# Patient Record
Sex: MALE | Race: WHITE | NOT HISPANIC OR LATINO | Employment: OTHER | URBAN - METROPOLITAN AREA
[De-identification: names, ages, dates, MRNs, and addresses within clinical notes are randomized per-mention and may not be internally consistent; named-entity substitution may affect disease eponyms.]

---

## 2020-01-06 ENCOUNTER — HISTORICAL (OUTPATIENT)
Dept: LAB | Facility: HOSPITAL | Age: 60
End: 2020-01-06

## 2020-01-06 LAB
ABS NEUT (OLG): 4.46 X10(3)/MCL (ref 2.1–9.2)
ALBUMIN SERPL-MCNC: 4 GM/DL (ref 3.4–5)
ALBUMIN/GLOB SERPL: 1.2 RATIO (ref 1.1–2)
ALP SERPL-CCNC: 71 UNIT/L (ref 46–116)
ALT SERPL-CCNC: 31 UNIT/L (ref 12–78)
AST SERPL-CCNC: 16 UNIT/L (ref 15–37)
BASOPHILS # BLD AUTO: 0 X10(3)/MCL (ref 0–0.2)
BASOPHILS NFR BLD AUTO: 1 %
BILIRUB SERPL-MCNC: 0.6 MG/DL (ref 0.2–1)
BILIRUBIN DIRECT+TOT PNL SERPL-MCNC: 0.19 MG/DL (ref 0–0.2)
BILIRUBIN DIRECT+TOT PNL SERPL-MCNC: 0.41 MG/DL (ref 0–0.8)
BUN SERPL-MCNC: 20.8 MG/DL (ref 7–18)
CALCIUM SERPL-MCNC: 9.5 MG/DL (ref 8.5–10.1)
CHLORIDE SERPL-SCNC: 103 MMOL/L (ref 98–107)
CO2 SERPL-SCNC: 26.8 MMOL/L (ref 21–32)
CREAT SERPL-MCNC: 1.34 MG/DL (ref 0.6–1.3)
EOSINOPHIL # BLD AUTO: 0.2 X10(3)/MCL (ref 0–0.9)
EOSINOPHIL NFR BLD AUTO: 2 %
ERYTHROCYTE [DISTWIDTH] IN BLOOD BY AUTOMATED COUNT: 12.2 % (ref 11.5–17)
EST. AVERAGE GLUCOSE BLD GHB EST-MCNC: 111 MG/DL
GLOBULIN SER-MCNC: 3.4 GM/DL (ref 2.4–3.5)
GLUCOSE SERPL-MCNC: 84 MG/DL (ref 74–106)
HBA1C MFR BLD: 5.5 % (ref 4.5–6.2)
HCT VFR BLD AUTO: 41.5 % (ref 42–52)
HGB BLD-MCNC: 13.8 GM/DL (ref 14–18)
IMM GRANULOCYTES # BLD AUTO: 0.01 % (ref 0–0.02)
IMM GRANULOCYTES NFR BLD AUTO: 0.1 % (ref 0–0.43)
LYMPHOCYTES # BLD AUTO: 2.2 X10(3)/MCL (ref 0.6–4.6)
LYMPHOCYTES NFR BLD AUTO: 30 %
MAGNESIUM SERPL-MCNC: 2 MG/DL (ref 1.8–2.4)
MCH RBC QN AUTO: 30.7 PG (ref 27–31)
MCHC RBC AUTO-ENTMCNC: 33.3 GM/DL (ref 33–36)
MCV RBC AUTO: 92.4 FL (ref 80–94)
MONOCYTES # BLD AUTO: 0.4 X10(3)/MCL (ref 0.1–1.3)
MONOCYTES NFR BLD AUTO: 5 %
NEUTROPHILS # BLD AUTO: 4.46 X10(3)/MCL (ref 1.4–7.9)
NEUTROPHILS NFR BLD AUTO: 62 %
PLATELET # BLD AUTO: 257 X10(3)/MCL (ref 130–400)
PMV BLD AUTO: 8.9 FL (ref 9.4–12.4)
POTASSIUM SERPL-SCNC: 4.1 MMOL/L (ref 3.5–5.1)
PROT SERPL-MCNC: 7.4 GM/DL (ref 6.4–8.2)
RBC # BLD AUTO: 4.49 X10(6)/MCL (ref 4.7–6.1)
SODIUM SERPL-SCNC: 139 MMOL/L (ref 136–145)
T3RU NFR SERPL: 36 % (ref 31–39)
T4 SERPL-MCNC: 7.7 MCG/DL (ref 4.7–13.3)
TSH SERPL-ACNC: 1.09 MIU/ML (ref 0.36–3.74)
VIT B12 SERPL-MCNC: 270 PG/ML (ref 193–986)
WBC # SPEC AUTO: 7.2 X10(3)/MCL (ref 4.5–11.5)

## 2021-03-20 ENCOUNTER — HISTORICAL (OUTPATIENT)
Dept: LAB | Facility: HOSPITAL | Age: 61
End: 2021-03-20

## 2021-03-20 LAB
ALBUMIN SERPL-MCNC: 3.9 GM/DL (ref 3.4–4.8)
ALBUMIN/GLOB SERPL: 1.5 RATIO (ref 1.1–2)
ALP SERPL-CCNC: 100 UNIT/L (ref 40–150)
ALT SERPL-CCNC: 50 UNIT/L (ref 0–55)
AST SERPL-CCNC: 28 UNIT/L (ref 5–34)
BILIRUB SERPL-MCNC: 0.8 MG/DL
BILIRUBIN DIRECT+TOT PNL SERPL-MCNC: 0.3 MG/DL (ref 0–0.5)
BILIRUBIN DIRECT+TOT PNL SERPL-MCNC: 0.5 MG/DL (ref 0–0.8)
BUN SERPL-MCNC: 16.8 MG/DL (ref 8.4–25.7)
CALCIUM SERPL-MCNC: 8.5 MG/DL (ref 8.8–10)
CHLORIDE SERPL-SCNC: 106 MMOL/L (ref 98–107)
CHOLEST SERPL-MCNC: 189 MG/DL
CHOLEST/HDLC SERPL: 3 {RATIO} (ref 0–5)
CO2 SERPL-SCNC: 27 MMOL/L (ref 23–31)
CREAT SERPL-MCNC: 0.81 MG/DL (ref 0.73–1.18)
DEPRECATED CALCIDIOL+CALCIFEROL SERPL-MC: 22.3 NG/ML (ref 30–80)
ERYTHROCYTE [DISTWIDTH] IN BLOOD BY AUTOMATED COUNT: 12.9 % (ref 11.5–17)
FT4I SERPL CALC-MCNC: 2.41 (ref 2.6–3.6)
GLOBULIN SER-MCNC: 2.6 GM/DL (ref 2.4–3.5)
GLUCOSE SERPL-MCNC: 92 MG/DL (ref 82–115)
HCT VFR BLD AUTO: 44.3 % (ref 42–52)
HDLC SERPL-MCNC: 55 MG/DL (ref 35–60)
HGB BLD-MCNC: 14.2 GM/DL (ref 14–18)
LDLC SERPL CALC-MCNC: 119 MG/DL (ref 50–140)
MCH RBC QN AUTO: 30.1 PG (ref 27–31)
MCHC RBC AUTO-ENTMCNC: 32.1 GM/DL (ref 33–36)
MCV RBC AUTO: 93.9 FL (ref 80–94)
PLATELET # BLD AUTO: 224 X10(3)/MCL (ref 130–400)
PMV BLD AUTO: 8.6 FL (ref 9.4–12.4)
POTASSIUM SERPL-SCNC: 4.2 MMOL/L (ref 3.5–5.1)
PROT SERPL-MCNC: 6.5 GM/DL (ref 5.8–7.6)
PSA SERPL-MCNC: 0.33 NG/ML
RBC # BLD AUTO: 4.72 X10(6)/MCL (ref 4.7–6.1)
SODIUM SERPL-SCNC: 142 MMOL/L (ref 136–145)
T3RU NFR SERPL: 36.5 % (ref 31–39)
T4 SERPL-MCNC: 6.61 UG/DL (ref 4.87–11.72)
TRIGL SERPL-MCNC: 75 MG/DL (ref 34–140)
TSH SERPL-ACNC: 0.94 UIU/ML (ref 0.35–4.94)
VLDLC SERPL CALC-MCNC: 15 MG/DL
WBC # SPEC AUTO: 5.8 X10(3)/MCL (ref 4.5–11.5)

## 2023-11-03 ENCOUNTER — LAB VISIT (OUTPATIENT)
Dept: LAB | Facility: HOSPITAL | Age: 63
End: 2023-11-03
Attending: INTERNAL MEDICINE
Payer: MEDICARE

## 2023-11-03 DIAGNOSIS — Z79.899 ENCOUNTER FOR LONG-TERM (CURRENT) USE OF MEDICATIONS: ICD-10-CM

## 2023-11-03 DIAGNOSIS — E55.9 VITAMIN D DEFICIENCY: ICD-10-CM

## 2023-11-03 DIAGNOSIS — Z00.00 ROUTINE GENERAL MEDICAL EXAMINATION AT A HEALTH CARE FACILITY: ICD-10-CM

## 2023-11-03 DIAGNOSIS — Z12.5 SCREENING FOR PROSTATE CANCER: ICD-10-CM

## 2023-11-03 DIAGNOSIS — R53.83 FATIGUE, UNSPECIFIED TYPE: Primary | ICD-10-CM

## 2023-11-03 DIAGNOSIS — Z82.49 FAMILY HISTORY OF ISCHEMIC HEART DISEASE: ICD-10-CM

## 2023-11-03 LAB
ALBUMIN SERPL-MCNC: 3.9 G/DL (ref 3.4–4.8)
ALBUMIN/GLOB SERPL: 1.1 RATIO (ref 1.1–2)
ALP SERPL-CCNC: 105 UNIT/L (ref 40–150)
ALT SERPL-CCNC: 40 UNIT/L (ref 0–55)
AST SERPL-CCNC: 25 UNIT/L (ref 5–34)
BASOPHILS # BLD AUTO: 0.04 X10(3)/MCL
BASOPHILS NFR BLD AUTO: 0.7 %
BILIRUB SERPL-MCNC: 0.6 MG/DL
BUN SERPL-MCNC: 18.6 MG/DL (ref 8.4–25.7)
CALCIUM SERPL-MCNC: 9.4 MG/DL (ref 8.8–10)
CHLORIDE SERPL-SCNC: 107 MMOL/L (ref 98–107)
CHOLEST SERPL-MCNC: 189 MG/DL
CHOLEST/HDLC SERPL: 3 {RATIO} (ref 0–5)
CO2 SERPL-SCNC: 27 MMOL/L (ref 23–31)
CREAT SERPL-MCNC: 1.13 MG/DL (ref 0.73–1.18)
DEPRECATED CALCIDIOL+CALCIFEROL SERPL-MC: 64.1 NG/ML (ref 30–80)
EOSINOPHIL # BLD AUTO: 0.14 X10(3)/MCL (ref 0–0.9)
EOSINOPHIL NFR BLD AUTO: 2.4 %
ERYTHROCYTE [DISTWIDTH] IN BLOOD BY AUTOMATED COUNT: 13.1 % (ref 11.5–17)
ERYTHROCYTE [SEDIMENTATION RATE] IN BLOOD: 9 MM/HR (ref 0–15)
EST. AVERAGE GLUCOSE BLD GHB EST-MCNC: 105.4 MG/DL
FT4I SERPL CALC-MCNC: 2.65 (ref 2.6–3.6)
GFR SERPLBLD CREATININE-BSD FMLA CKD-EPI: >60 MLS/MIN/1.73/M2
GLOBULIN SER-MCNC: 3.4 GM/DL (ref 2.4–3.5)
GLUCOSE SERPL-MCNC: 90 MG/DL (ref 82–115)
HBA1C MFR BLD: 5.3 %
HCT VFR BLD AUTO: 44.8 % (ref 42–52)
HDLC SERPL-MCNC: 61 MG/DL (ref 35–60)
HGB BLD-MCNC: 14 G/DL (ref 14–18)
IMM GRANULOCYTES # BLD AUTO: 0.01 X10(3)/MCL (ref 0–0.04)
IMM GRANULOCYTES NFR BLD AUTO: 0.2 %
LDLC SERPL CALC-MCNC: 115 MG/DL (ref 50–140)
LYMPHOCYTES # BLD AUTO: 1.75 X10(3)/MCL (ref 0.6–4.6)
LYMPHOCYTES NFR BLD AUTO: 29.4 %
MAGNESIUM SERPL-MCNC: 2 MG/DL (ref 1.6–2.6)
MCH RBC QN AUTO: 29.5 PG (ref 27–31)
MCHC RBC AUTO-ENTMCNC: 31.3 G/DL (ref 33–36)
MCV RBC AUTO: 94.5 FL (ref 80–94)
MONOCYTES # BLD AUTO: 0.39 X10(3)/MCL (ref 0.1–1.3)
MONOCYTES NFR BLD AUTO: 6.6 %
NEUTROPHILS # BLD AUTO: 3.62 X10(3)/MCL (ref 2.1–9.2)
NEUTROPHILS NFR BLD AUTO: 60.7 %
PLATELET # BLD AUTO: 278 X10(3)/MCL (ref 130–400)
PMV BLD AUTO: 9 FL (ref 7.4–10.4)
POTASSIUM SERPL-SCNC: 4.4 MMOL/L (ref 3.5–5.1)
PROT SERPL-MCNC: 7.3 GM/DL (ref 5.8–7.6)
PSA SERPL-MCNC: 0.35 NG/ML
RBC # BLD AUTO: 4.74 X10(6)/MCL (ref 4.7–6.1)
SODIUM SERPL-SCNC: 140 MMOL/L (ref 136–145)
T3RU NFR SERPL: 34.32 % (ref 31–39)
T4 SERPL-MCNC: 7.72 UG/DL (ref 4.87–11.72)
TRIGL SERPL-MCNC: 66 MG/DL (ref 34–140)
TSH SERPL-ACNC: 1.17 UIU/ML (ref 0.35–4.94)
VLDLC SERPL CALC-MCNC: 13 MG/DL
WBC # SPEC AUTO: 5.95 X10(3)/MCL (ref 4.5–11.5)

## 2023-11-03 PROCEDURE — 84153 ASSAY OF PSA TOTAL: CPT

## 2023-11-03 PROCEDURE — 80053 COMPREHEN METABOLIC PANEL: CPT

## 2023-11-03 PROCEDURE — 83735 ASSAY OF MAGNESIUM: CPT

## 2023-11-03 PROCEDURE — 82306 VITAMIN D 25 HYDROXY: CPT

## 2023-11-03 PROCEDURE — 36415 COLL VENOUS BLD VENIPUNCTURE: CPT

## 2023-11-03 PROCEDURE — 85652 RBC SED RATE AUTOMATED: CPT

## 2023-11-03 PROCEDURE — 84436 ASSAY OF TOTAL THYROXINE: CPT

## 2023-11-03 PROCEDURE — 85025 COMPLETE CBC W/AUTO DIFF WBC: CPT

## 2023-11-03 PROCEDURE — 84443 ASSAY THYROID STIM HORMONE: CPT

## 2023-11-03 PROCEDURE — 80061 LIPID PANEL: CPT

## 2023-11-03 PROCEDURE — 83036 HEMOGLOBIN GLYCOSYLATED A1C: CPT

## 2024-07-22 ENCOUNTER — TELEPHONE (OUTPATIENT)
Dept: NEUROLOGY | Facility: CLINIC | Age: 64
End: 2024-07-22
Payer: MEDICARE

## 2024-07-22 NOTE — TELEPHONE ENCOUNTER
Patient called back patient wife asked what was the call about. I explained the call is about DBS Consultation. Patient explains that she wanted a second opinion of what her  has. He keeps falling out of bed. He previous doctor recommend him to see another movement disorder specialist. But I inform them that he is not taking new patients I scheduled her on September 16th virtual visit.

## 2024-07-24 ENCOUNTER — TELEPHONE (OUTPATIENT)
Dept: NEUROLOGY | Facility: CLINIC | Age: 64
End: 2024-07-24
Payer: MEDICARE

## 2024-07-24 NOTE — TELEPHONE ENCOUNTER
----- Message from Berenice Pérez MA sent at 7/17/2024  8:45 AM CDT -----  Contact: Dr Miramontes-FAX    ----- Message -----  From: Marielena Hogan  Sent: 7/17/2024   8:32 AM CDT  To: Carlos Molina Staff    7/17/24      .Type:  Sooner Appointment Request    Caller is requesting a sooner appointment.  Caller declined first available appointment listed below.  Caller will not accept being placed on the waitlist and is requesting a message be sent to doctor.  Name of Caller: FAX   When is the first available appointment?   Symptoms:parkinsons- referral in media mgr  Would the patient rather a call back or a response via [facility]?  callback  Best Call Back Number:.[phone] (home)         Additional Information:  spoke w pt spouse, Dr Gonzalez requested

## 2024-08-01 ENCOUNTER — TELEPHONE (OUTPATIENT)
Dept: NEUROLOGY | Facility: CLINIC | Age: 64
End: 2024-08-01
Payer: MEDICARE

## 2024-08-01 ENCOUNTER — PATIENT MESSAGE (OUTPATIENT)
Dept: NEUROLOGY | Facility: CLINIC | Age: 64
End: 2024-08-01
Payer: MEDICARE

## 2024-08-01 NOTE — TELEPHONE ENCOUNTER
Called patient today about offering an appointment with the  on August 9th at 8:30am for a DBS ON/OFF Eval. Patient's wife picks up and asked about an ON/OFF. I explain to her what an ON/OFF is. She said she will give me a call back because her daughter is the one to transport them to the doctor.    UPDATE    She called me back and said the cannot make it. Next week school starts daughter has to take the kids to school. Patient's wife express gratitude for me helping her. I told her that her  still has his original appointment and that ill be sending another ON/OFF instructions with the original date.

## 2024-09-16 ENCOUNTER — TELEPHONE (OUTPATIENT)
Dept: NEUROLOGY | Facility: CLINIC | Age: 64
End: 2024-09-16
Payer: MEDICARE

## 2024-09-16 NOTE — TELEPHONE ENCOUNTER
Called patient today about there virtual appointment for DBS Consultation. Patient's wife picks up the phone and informs me that she does not have her husbands portal username. Informed her that she can reset his user name by clicking forgot user name. Patient's wife did it and was going thought the process of filling out the information. I told them I would call them back.

## 2024-10-04 ENCOUNTER — OFFICE VISIT (OUTPATIENT)
Dept: NEUROLOGY | Facility: CLINIC | Age: 64
End: 2024-10-04
Payer: MEDICARE

## 2024-10-04 ENCOUNTER — TELEPHONE (OUTPATIENT)
Dept: NEUROSURGERY | Facility: CLINIC | Age: 64
End: 2024-10-04
Payer: MEDICARE

## 2024-10-04 ENCOUNTER — PATIENT MESSAGE (OUTPATIENT)
Dept: NEUROLOGY | Facility: CLINIC | Age: 64
End: 2024-10-04

## 2024-10-04 DIAGNOSIS — G20.A2 PARKINSON'S DISEASE WITHOUT DYSKINESIA, WITH FLUCTUATING MANIFESTATIONS: Primary | ICD-10-CM

## 2024-10-04 NOTE — PROGRESS NOTES
The patient location is: home  The chief complaint leading to consultation is: iPD  Visit type: audiovisual  Total time spent with patient: 40mins  Each patient to whom he or she provides medical services by telemedicine is:  (1) informed of the relationship between the physician and patient and the respective role of any other health care provider with respect to management of the patient; and (2) notified that he or she may decline to receive medical services by telemedicine and may withdraw from such care at any time.    Notes: below    MOVEMENT DISORDERS CLINIC NEW VIDEO CONSULT NOTE    PCP/Referring Provider: Self, Aaareferral  No address on file  Date of Service: 10/4/2024    Chief Complaint: iPD    HPI: ELIZA Birch is a R HANDED 63 y.o. male with a medical issues significant for depression, iPD otherwise healthy comingf or DBS eval. Saw Dr. Gan. He reports 6-7 years ago he started with tremor R>L which progressed. Mostly upper body symptoms, tremor most prominent. At times he drags R leg. Fallen 3 times in past. No assist device needed. Still biking.    He forgets to take medicine and resists    His goal for DBS it to take less medications    Knows someone with DBS    No dyskiensia   Takes carbidopa/levodopa 25/100mg 1 tab PO BID  Selegiline 5mg BID  Entacapone 200mg TID  Neupro patch 4mg QHS  venlafaxine      Current Living Situation: at home      Review of Systems:   Review of Systems   Constitutional:  Negative for fever.   HENT:  Negative for congestion.    Eyes:  Negative for double vision.   Respiratory:  Negative for cough and shortness of breath.    Cardiovascular:  Negative for chest pain and leg swelling.   Gastrointestinal:  Negative for nausea.   Genitourinary:  Negative for dysuria.   Musculoskeletal:  Positive for falls.   Skin:  Negative for rash.   Neurological:  Positive for tremors and speech change. Negative for headaches.   Psychiatric/Behavioral:  Positive for depression.  Negative for memory loss.          Current Medications:  No outpatient encounter medications on file as of 10/4/2024.     No facility-administered encounter medications on file as of 10/4/2024.       Past Medical History:  Patient Active Problem List   Diagnosis    Parkinson's disease without dyskinesia, with fluctuating manifestations       Past Surgical History:  No past surgical history on file.    Social:  Social History     Socioeconomic History    Marital status:      Social Drivers of Health     Financial Resource Strain: Medium Risk (9/16/2024)    Overall Financial Resource Strain (CARDIA)     Difficulty of Paying Living Expenses: Somewhat hard   Food Insecurity: Food Insecurity Present (9/16/2024)    Hunger Vital Sign     Worried About Running Out of Food in the Last Year: Often true     Ran Out of Food in the Last Year: Sometimes true   Physical Activity: Sufficiently Active (9/16/2024)    Exercise Vital Sign     Days of Exercise per Week: 7 days     Minutes of Exercise per Session: 60 min   Stress: Stress Concern Present (9/16/2024)    [employer] [place] [employer] - Occupational Stress Questionnaire     Feeling of Stress : Very much   Housing Stability: High Risk (9/16/2024)    Housing Stability Vital Sign     Unable to Pay for Housing in the Last Year: Yes       Family History:  No family history on file.      ON/OFF VIDEO Rating Scale    Time since levodopa: 24hrs  Time to ON : ?    MOTOR EXAMINATION (OFF)       Speech  2 - Monotone, slurred but understandable; moderately impaired.   Facial Expression  2 - Slight but definitely abnormal diminution of facial expression.    Tremor at Rest:      Face, lips, chin 2 - Mild in amplitude and persistent. Or moderate in amplitude, but only intermittently present.    Hands:      right 3 - Moderate in amplitude and present most of the time.   left 3 - Moderate in amplitude and present most of the time.   Feet:      right 2 - Mild in amplitude  and persistent. Or moderate in amplitude, but only intermittently present.    left 3 - Moderate in amplitude and present most of the time.   Action or Postural Tremor of Hands      right 1 - Slight; present with action.   left 3 - Moderate in amplitude wiht posture holding as well as action.   Rigidity                          Finger Taps      right 3 - Severely impaired. Frequent hesitation in initiating movements or arrests in ongoing movement.   left 3 - Severely impaired. Frequent hesitation in initiating movements or arrests in ongoing movement.   Hand Movements      right 3 - Severely impaired. Frequent hesitation in initiating movements or arrests in ongoing movement.   left 3 - Severely impaired. Frequent hesitation in initiating movements or arrests in ongoing movement.   Rapid Alternating Movements of Hands      right 2 - Moderately impaired. Definite and early fatiguing. May have occasional arrests in movement.   left 2 - Moderately impaired. Definite and early fatiguing. May have occasional arrests in movement.   Leg Agility      right 1 - Mild slowing and/or reduction in amplitude.   left 1 - Mild slowing and/or reduction in amplitude.   Arising from Chair  1 - Slow; or may need more than one attempt.   Posture  1 - Not quite erect, slightly stooped posture; could be normal for older person.   Gait  1 - Walks slowly, may shuffle with short steps, but no festination (hastening steps) or propulsion.       Body Bradykinesia and Hypokinesia (Combining slowness, hesitancy, decreased armswing, small amplitude, and poverty of movement in general)  2 - Mild degree of slowness and poverty of movement which is definitely abnormal.       _______________________________________________________________      MOTOR EXAMINATION (ON)       Speech  0 - Normal.   Facial Expression  2 - Slight but definitely abnormal diminution of facial expression.    Tremor at Rest:      Face, lips, chin 0 - Absent.    Hands:      right  1 - Slight and infrequently present.    left 1 - Slight and infrequently present.    Feet:      right 0 - Absent.    left 0 - Absent.    Action or Postural Tremor of Hands      right 0 - Absent.    left 1 - Slight; present with action.   Rigidity                          Finger Taps      right 1 - Mild slowing and/or reduction in amplitude.   left 2 - Moderately impaired. Definite and early fatiguing. May have occasional arrests in movement.   Hand Movements      right 1 - Mild slowing and/or reduction in amplitude.   left 1 - Mild slowing and/or reduction in amplitude.   Rapid Alternating Movements of Hands      right 0 - Normal.   left 0 - Normal.   Leg Agility      right 0 - Normal.   left 0 - Normal.   Arising from Chair  0 - Normal.   Posture  0 - Normal erect.   Gait  0 - Normal.       Body Bradykinesia and Hypokinesia (Combining slowness, hesitancy, decreased armswing, small amplitude, and poverty of movement in general)  0 - None.       Yessica Christopher MD, MS [facility]  Department of Neurology  Movement Disorders        Laboratory Data:  NA    Imaging:  NA    Assessment//Plan:   Problem List Items Addressed This Visit          Neuro    Parkinson's disease without dyskinesia, with fluctuating manifestations - Primary    Current Assessment & Plan     Typical iPD with prominent UE tremors, minimal gait involvement. Robust Ldopa OFF/ON response suggestive of good DBS candidacy  TO complete candidacy eval with meet with NSGY and Neuropsych  Review expectation file with family  His goals are tremor reduction and med reduction best acchieved with STN target  Memory test to help decide target         Relevant Orders    Ambulatory consult to Neuropsychology       Yessica Christopher MD, MS [facility]  Department of Neurology  Movement Disorders

## 2024-10-04 NOTE — ASSESSMENT & PLAN NOTE
Typical iPD with prominent UE tremors, minimal gait involvement. Robust Ldopa OFF/ON response suggestive of good DBS candidacy  TO complete candidacy eval with meet with NSGY and Neuropsych  Review expectation file with family  His goals are tremor reduction and med reduction best acchieved with STN target  Memory test to help decide target

## 2024-10-04 NOTE — TELEPHONE ENCOUNTER
"Pt's wife would like to be informed of any can cancellations for the appt to be at an ealier date for at a later time in the day on 10/17. She will keep the appt scheduled as is.     ----- Message from Ana sent at 10/4/2024  3:34 PM CDT -----  Regarding: pt adivce4  Contact: [phone]  .Name Of Caller: Self     Contact Preference?: [phone]     What is the nature of the call?: pt needing to speak to the nurse. Pls call      Additional Notes:  "Thank you for all that you do for our patients"  "

## 2024-10-08 ENCOUNTER — OFFICE VISIT (OUTPATIENT)
Dept: NEUROLOGY | Facility: CLINIC | Age: 64
End: 2024-10-08
Payer: MEDICARE

## 2024-10-08 ENCOUNTER — TELEPHONE (OUTPATIENT)
Dept: NEUROSURGERY | Facility: CLINIC | Age: 64
End: 2024-10-08
Payer: MEDICARE

## 2024-10-08 DIAGNOSIS — G31.84 MCI (MILD COGNITIVE IMPAIRMENT): Primary | ICD-10-CM

## 2024-10-08 DIAGNOSIS — G20.A2 PARKINSON'S DISEASE WITHOUT DYSKINESIA, WITH FLUCTUATING MANIFESTATIONS: ICD-10-CM

## 2024-10-08 DIAGNOSIS — F32.4 MAJOR DEPRESSIVE DISORDER WITH SINGLE EPISODE, IN PARTIAL REMISSION: ICD-10-CM

## 2024-10-08 PROCEDURE — 96138 PSYCL/NRPSYC TECH 1ST: CPT | Mod: S$PBB,,, | Performed by: STUDENT IN AN ORGANIZED HEALTH CARE EDUCATION/TRAINING PROGRAM

## 2024-10-08 PROCEDURE — 99999 PR PBB SHADOW E&M-EST. PATIENT-LVL I: CPT | Mod: PBBFAC,,, | Performed by: STUDENT IN AN ORGANIZED HEALTH CARE EDUCATION/TRAINING PROGRAM

## 2024-10-08 PROCEDURE — 99211 OFF/OP EST MAY X REQ PHY/QHP: CPT | Mod: PBBFAC

## 2024-10-08 PROCEDURE — 99211 OFF/OP EST MAY X REQ PHY/QHP: CPT | Mod: PBBFAC,27 | Performed by: STUDENT IN AN ORGANIZED HEALTH CARE EDUCATION/TRAINING PROGRAM

## 2024-10-08 PROCEDURE — 96139 PSYCL/NRPSYC TST TECH EA: CPT | Mod: S$PBB,,, | Performed by: STUDENT IN AN ORGANIZED HEALTH CARE EDUCATION/TRAINING PROGRAM

## 2024-10-08 PROCEDURE — 96116 NUBHVL XM PHYS/QHP 1ST HR: CPT | Mod: S$PBB,,, | Performed by: STUDENT IN AN ORGANIZED HEALTH CARE EDUCATION/TRAINING PROGRAM

## 2024-10-08 PROCEDURE — 99499 UNLISTED E&M SERVICE: CPT | Mod: S$PBB,,, | Performed by: STUDENT IN AN ORGANIZED HEALTH CARE EDUCATION/TRAINING PROGRAM

## 2024-10-08 PROCEDURE — 99999 PR PBB SHADOW E&M-EST. PATIENT-LVL I: CPT | Mod: PBBFAC,,,

## 2024-10-08 PROCEDURE — 96116 NUBHVL XM PHYS/QHP 1ST HR: CPT | Mod: PBBFAC | Performed by: STUDENT IN AN ORGANIZED HEALTH CARE EDUCATION/TRAINING PROGRAM

## 2024-10-08 PROCEDURE — 96133 NRPSYC TST EVAL PHYS/QHP EA: CPT | Mod: S$PBB,,, | Performed by: STUDENT IN AN ORGANIZED HEALTH CARE EDUCATION/TRAINING PROGRAM

## 2024-10-08 PROCEDURE — 96132 NRPSYC TST EVAL PHYS/QHP 1ST: CPT | Mod: S$PBB,,, | Performed by: STUDENT IN AN ORGANIZED HEALTH CARE EDUCATION/TRAINING PROGRAM

## 2024-10-08 NOTE — PROGRESS NOTES
NEUROPSYCHOLOGY CONSULT    Referral Information  NAME:  ELIZA Birch DATE OF SERVICE: 10/08/2024   MRN#:  [id number] EDUCATION: 12   AGE: 63 y.o. HANDEDNESS: Right    : 1960 RACE: White   SEX: Male REFERRAL: Yessica Christopher MD;  Neurology, [facility]     Evaluation methods: I had the pleasure of seeing ELIZA Birch on 10/08/2024 in person at the [facility], Department of Neurology. Data sources for the below report include review of the available medical record, an interview with the patient, a collateral interview with their daughter with their expressed consent, and administration of a series of neuropsychological tests listed in the Results section of this report. At the outset of the appointment, the undersigned explained the rationale for the evaluation along with the limits of confidentiality; and verbal informed consent for this evaluation was obtained.    This assessment was completed in conjunction with neurospychology practicum student, Steven Zapata MA, under my direct supervision. All aspects of this report have been reviewed and revised as appropriate.    The chief complaint/medical necessity leading to consultation/medical necessity is: cognitive decline    NEUROPSYCHOLOGICAL EVALUATION - CONFIDENTIAL    SUMMARY/TREATMENT PLAN   Summary of History:  Mr. Birch is a 63 y.o., right-handed, White, man with 12 years of formal education. He was referred by his neurologist for presurgical evaluation in consideration of DBS surgery for management of idiopathic Parkinson's disease (IPD). On/off evaluation completed on 10/04/2024 revealed a robust response to levodopa. Medical history is notable for the onset of parkinsonian symptoms approximately 6-7 years ago as R>L tremor which has progressed, per his referring movement disorders neurologist. Given his goal of achieving tremor reduction and reduced medications, his team is considering a STN target at this  time. Brain imaging is not available for review. Most-recent laboratory studies were drawn on 11/03/2023 and were non-contributory.    During interview, Mr. Birch reported the insidious onset of cognitive concerns beginning approximately in October 2023, approximately 5-6 years after the onset of movement symptoms. He characterized difficulties with his working memory and subtle changes in word-finding. Functionally, Mr. Birch denied changes in functional independence related to his above cognitive concerns.    Emotionally, Mr. Birch is being treated for depression and anxiety, via Venlafaxine, by his neurologist. He discussed good response to this therapy, and noted that when he ran out of Venlafaxine he had the return of depression and experienced brief, passive thoughts of suicide without plan or intent. He denied current suicidal ideation when assessed. Mr. Birch endorsed RBD, which has become less troubling since he commenced Rotigotine. With treatment, he denied difficulties falling and staying asleep.    As to his goals for DBS surgery, Mr. Birch discussed an interest in managing tremor and reducing his medication burden. He appropriately discussed pros and cons of surgery and is considering these as well as timing and risk in making determinations about his surgical interest.      Test Results:    Key Findings:     Mr. Birch is a man of estimated low average baseline cognitive functioning based on a combined metric including demographic data and his performance on a single word reading task.   Performances in the following areas were within normal limits, suggesting intact cognitive functioning in relevant domains:   Expressive language (confrontation naming and fluency)  Visuospatial skills   Verbal and visual abstraction  Auditory attention and working memory (variable; see behavioral observations)  Processing speed  Performances in the following areas were below normal  limits, suggesting weakness or declines in relevant domains:   Learning, retention, and recall for verbal information with variable benefit from recognition (frequent intrusion errors).   Learning, and recall for visual information with good retention and minimal benefit from recognition cues  Set-shifting/multitasking  Fluid reasoning and problem-solving with continuous feedback (markedly perseverative response style)  Behaviorally, Mr. Birch appeared to retain information adequately to track the assessment and interview and recalled details from conversations with the examiner after several hours.   Mr. Birch's daughter endorsed mild depression with minimal distress on a neuropsychiatric questionnaire.   Mr. Birch's daughter denied clinically significant declines in everyday functioning.   On screening measures of depression and anxiety, Mr. Birch did not endorse clinically significant symptoms of depression or anxiety.     Data Synthesis: Test performances suggest predominately frontal-subcortical systems dysfunction; mesial temporal systems dysfunction are less-likely based on the above, though cannot be wholly ruled out.      Diagnostic Considerations: Evidence for clinically-significant cognitive decline, absent clear evidence of functional impairment is consistent with mild cognitive impairment.    Etiologic Considerations: Parkinson's disease is thought the most likely etiology for his cognitive concerns. Hs past use of alcohol is a likely contributing factor. Workup below may help rule out a co-occurring degenerative neurocognitive process although this is thought less likely.    Determination of surgical risk: Given the above cognitive concerns, Mr. Birch is at elevated cognitive risk should he undergo STN DBS surgery, particularly as it relates to memory changes. Significant behavioral, psychological (on his current regimen), social, or decision-making risk factors were not  identified.       Diagnoses  1. MCI (mild cognitive impairment)        2. Parkinson's disease without dyskinesia, with fluctuating manifestations        3. Major depressive disorder with single episode, in partial remission             Provider Recommendations:   Mr. Birch will be encouraged to follow up with his referring provider in order to integrate these findings into the overall context of his clinical care, and to implement any of the below recommendations as appropriate.     Consider medications to manage cognitive decline in the context of a neurocognitive disease, relative to risk of worsening tremor.      Current pharmacologic management of depression appears appropriate.     Repeat assessment is recommended in 12 months, in order to assess for changes over time and update his treatment plan. Scores from this assessment should be used as a baseline for comparison at that time.     Patient Recommendations:  The next step in your care is to follow up with your referring provider in order to help with continued management of your care. The below recommendations may help you and your family compensate for your difficulties and better understand the reasons for your cognitive changes.     Due to difficulties with memory on cognitive testing in particular, there is increased risk that DBS surgery to the currently proposed target will affect your memory and day-to-day functional independence. Consider discussing options with your neurosurgical team.     I have asked your team to consider medications to help manage cognitive changes most likely due to Parkinson's disease. Because these medications can help thinking skills but in some cases can make tremor worse, I encourage you to consider the pros and cons before choosing to start these medications.    Engage in physical activity (i.e., something that gets your heart rate up) totaling at least 15-30 minutes per day. Regular exercise is very important for  both long-term health and stress management. Walking, hiking, elliptical, stationary biking, dancing, Charlie Chi and yoga are all good options. Consider working with a physical therapist to tailor an exercise program to reduce fall risk and manage motor symptoms in Parkinson's disease.     Remain mentally engaged by keeping socially active, reading, learning new skills, playing games, or participating in a hobby.    Get a good night's sleep by avoiding screens 30-60 minutes before bed and sticking to a regular sleep schedule.    Eat a balanced, heart-healthy diet that is low in salt, saturated fats, and added sugar. The Mediterranean diet has been shown to be especially healthy; studies show that it may reduce the risk of developing dementia and slow the rate of age-related cognitive decline.   Because Parkinson's disease can increase risk of constipation, avoid high-fat diets and large meals as this can worsen constipation. Eat plenty of fiber and stay well hydrated.     Work closely with your doctor(s) to manage any vascular conditions such as high blood pressure, high cholesterol, and diabetes. Follow the management guidelines from the American Heart Association at www.heart.org.    Repeat assessment is indicated in 12 months, or sooner in the event that you or your family notice significant cognitive or functional declines. At that time scores from this assessment should be used as a baseline for comparison.      Mr. Birch will be provided the results of the evaluation.     Thank you for allowing me to participate in [facility].  If you have any questions, please contact me at [phone].        _____________________  Myles Vickers, Ph.D.  Neuropsychologist  Department of Neuropsychology  [facility], [place]    HISTORY OF PRESENT ILLNESS: Mr. ELIZA Birch is an 63 y.o., right-handed,  male with 12 years of education who was referred for a neuropsychological evaluation in the  setting of presurgical evaluation for consideration of DBS implantation for treatment of Parkinson's.     Onset and Course of Cognitive Concerns: Insidious, beginning approximately one year ago, as difficulties with mental tracking, often forgetting why he has gone to a room or what tool he had gone to the shop to get. Mr. Birch reported that his cognitive skills have been slowly worsening over time.    Characterization of Cognitive Concerns  Attention/ working memory: Mr. Birch discussed mental tracking concerns have been his chief difficulty.  Processing speed: Mr. Birch denied slowing of processing speed.  Language: Mr. Birch discussed subtle difficulties recalling names of those he sees infrequently. His daughter thought this was typical of his baseline.  Visual-spatial/ navigation: Mr. Birch denied difficulties with visual-spatial skills or navigation.  Psychomotor: Mr. Birch has right worse than left hand tremor, and mild lower lip tremor.  Memory: Mr. Birch denied difficulties with memory atypical of others his age; accounting for the above attention/ working memory concerns.   Executive Functions: Mr. Birch denied difficulties with decision-making or reasoning skills. He and his daughter denied compulsions, disinhibition, or other concerning frontal-behavioral changes.   Orientation: Mr. Birch denied difficulties in this domain.     PD Review of Symptoms:  Tremor: Present, R > L; initially mitigated with alcohol by report, observed first during withdrawal.   Gait change: He does not notice, though his family has observed stooped posture, and mild shuffling gait per his daughter.   Rigidity: Denied, not observed.   Bradykinesia: He discussed it is harder for him to open his mouth as wide as he used to without deliberative effort, which has made it harder to eat. Otherwise denied slow/ small movements.   Masked facies: Denied.   Anosmia: He discussed longstanding  "poor sense of smell.   Dysarthria/Hypophonia: Denied  Dysphagia/Sialorrhea: Sialorrhea is present, when he is tired, but not often.   Depression: He discussed a history of depression and anxiety, managed with venlafaxine. He discussed that when he ran out of medications depression markedly worsened. Depression began shortly after the onset of Parkinson's disease and the need to start to receive care that he was not used to.   Cognitive slowing: Denied  Fluctuating cognition: Denied chante fluctuations, he discussed sleepiness in the afternoons coinciding with his medications.   Hallucinations: Denied  Impulsivity: Denied  Obsessions/Compulsions: Denied  Urinary changes: Denied issues with continence, though discussed frequency.   Constipation: Denied  Orthostasis: Denied  Falls: He denied ground-level falls, tripping, or other such problems. He has fallen while his ladder fell.   Freezing: Denied. He has noticed that after sitting in a vehicle for a long time, it is harder for him to initiate movement but he discussed that this may be age-typical.   Micrographia: Denied, and not observed.  Sleep issues:       -NASRIN: Denied.       -RBD: He has been "fighting karate" in his sleep. He also yelled out in his sleep in the past. This has become less frequent recently, attributed to his medications.   Vision:  Unchanged, corrected to adequate.     Medication side-effects: He has started to notice some greater on/ off effects around dose times. He perceived that his medications lead him to be sleepy. He discussed that medication costs have also become a significant barrier such that he and his family have had to change life and longterm plans to accommodate.     Neuropsychiatric Symptoms:  Hallucinations: Denied  Delusional/Paranoid Thinking: Denied  Apathy: Denied  Irritability/Agitation: Denied  Disinhibition: Denied      DAILY FUNCTIONING:  Living Environment: He lives with his wife in a single family home.     BASIC " ADLS:  Feeding: independent  Dressing: independent  Bathing: independent  Toileting: independent    IADLS:  Support System: His wife, his daughter, as needed.   Appointment Management: independent generally, his schedule has become more complex due to his neurologic changes.   Medication Compliance: He has some missed doses of medications, although he discussed that this is a combination of preferring not to take some medications and doses; and now having to take medications in spite of not having to manage this activity for much of his life.   Financial Management: His wife has always managed finances, he and his daughter denied changes.   Cooking: independent, and effective.   Driving: He continues to drive and rides his motorcycle, reportedly without error.       BRAIN HEALTH RISK FACTORS:  Hearing Loss: He discussed mild hearing loss.   Vision Loss: Denied  Physical Activity: Very active, he has noticed some fatigue. He rides his electric assist bicycle often.   Social Isolation: Denied  Falls: Denied  Sleep: He sleeps soundly now reportedly with Neupro 4 mg patch.     MEDICAL HISTORY: Mr. Birch  has no past medical history on file.    NEUROIMAGING:  He discussed he had completed a brain scan, possibly an MRI that is unavailable for review.     Current medications: Mr. Birch currently has no medications in their medication list.     Review of patient's allergies indicates:  Not on File    PSYCHIATRIC HISTORY: He is managed for depression and anxiety via venlafaxine, prescribed by his neurologist.     SUICIDAL IDEATION:  History: He discussed brief passive thoughts of suicide without plan or intent when he ran out of Effexor.   Current Stressors: PD  Active Suicidal Ideation:Denied  Plan/Intent: Denied    FAMILY HISTORY: family history is not on file.    PSYCHOSOCIAL HISTORY:   Education:   Level Attained: 12   Learning Difficulties: Denied, remarking that he did not like school due to socializing. He  denied behavioral problems.    Special Education: Denied   Repeated Grade: Denied     Vocation:   Highest Attained: He worked for [employer]. 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    Relationship Status/ Support Network:   : Yes, for 40 years.    : No   Children: 3, and 5 grandchildren.      SUBSTANCE USE:   Alcohol: He drank between 6-12 beers a day for many years. He has reduced his pattern of use, and ceases drinking if he is taking medications that require him to do so. He denied a history of negative consequences from this pattern of use.   Recreational Substances: Denied  Tobacco Products: Denied.     MENTAL STATUS AND OBSERVATIONS:  APPEARANCE: Casually dressed and adequate grooming/hygiene.   ALERTNESS/ORIENTATION: Attentive and alert. Mr. Birch was fully oriented to person, place, and situation; and roughly to time, stating the date as the 12th of October.   GAIT/MOTOR: Subtle right upper extremity and lower lip tremor were observed, most noticeable when attention was drawn elsewhere. He was able to inhibit tremor with attention. He arose independently from a chair, using both hands on the arm rest.   SENSORY: Vision and hearing were adequate for assessment.   SPEECH/LANGUAGE: Normal in rate, rhythm, tone, and volume. Expressive and receptive language were grossly intact.  MOOD/AFFECT: Mood was euthymic and affect was mood-congruent.   INTERPERSONAL BEHAVIOR: Rapport was quickly and easily established   THOUGHT PROCESSES: Thoughts seemed logical and goal-directed.   TESTING OBSERVATIONS: Mr. Birch did all that was asked of him without complaint or criticism. He made self-critical comments when he perceived himself to be performing poorly. On one math-related task, he appeared to give answers without considering the  question. During a task of problem solving, Mr. Birch verbalized strategies to complete the task despite not utilizing them.     RESULTS     Tests Administered (manual norms used unless otherwise indicated): [employer] - Test of Premorbid Functioning (TOPF), Dot Counting Test (DCT), Wechsler Adult Intelligence Scale 4th Ed. (WAIS-IV) select subtests (Digit Span, Arithmetic, Coding, Vocabulary, Similarities, Matrix Reasoning, Visual Puzzles, and Block Design), Controlled Oral Word Association Test (COWAT; [facility] norms),Semantic Fluency (Animals; [facility] norms), Neuropsychological Assessment Battery (NAB) Naming subtest, Casiano Verbal Learning Test, Revised (HVLT-R), Wechsler Memory Scale, 4th Ed. Logical Memory (WMS IV-LM), Brief Visuospatial Memory Test, Revised (BVMT-R), Jimi-Osterrieth Complex Figure Test (RCFT) Copy trial, Burr Visual Organization Test (HVOT), Trail Making Test (TMT A/B; [facility] norms), [place] Card Sorting Test - 64 (WCST), Newsome Anxiety Inventory (KSENIA), Geriatric Depression Scale (GDS); Neuropsychiatric Inventory (NPI-Q caregiver form), and Activities of Daily Living Questionnaire (ADL-Q).    Score Label T-Score Standard Score Z-Score Scaled Score %ile Rank   Exceptionally High > 70 > 130 > 2.0  > 16 > 98   Above Average 64-69 120-129 1.4-1.9 15 91-97   High Average 57-63 110-119 0.7-1.3 12-14 75-90   Average 44-56  0.6 to -0.6 8-11 25-74   Low Average 37-43 80-89 -1.3 to -0.7 6-7 9-24   Below Average 30-36 70-79 -2.0 to -1.4 4-5 2-8   Exceptionally Low < 30 < 70  < -2.0 < 4 < 2       Performance Validity: Mr. Birch completed both stand-alone and embedded measures of task engagement. Below-cutoff performance on any one stand-alone measure and/ or any two embedded measures of task engagement is highly unlikely to occur outside the context of poor or inconsistent effort during testing. Mr. Birch scored above predetermined cutoffs on all administered measures  of task engagement. As such, there is no evidence to suggest poor or inconsistent engagement and their performance is deemed to be a reasonable reflection of their day-to-day cognitive status.       PREMORBID FUNCTIONING Raw Score Type of Standardized Score Standardized Score Percentile/CP Descriptor   TOPF simple dem. eFSIQ -  53 Average   TOPF pred. eFSIQ - SS 72 3 Below Average   TOPF simple + pred. eFSIQ - SS 86 18 Low Average   INTELLECTUAL FUNCTIONING Raw Score Type of Standardized Score Standardized Score Percentile/CP Descriptor   WAIS-IV         VCI - SS 76 5 Below Average   ROSE - SS 90 25 Average   WMI - SS 77 6 Below Average   PSI - SS 81 10 Low Average   FSIQ - SS 77 6 Below Average   GAI - ss 80 9 Low Average   LANGUAGE FUNCTIONING Raw Score Type of Standardized Score Standardized Score Percentile/CP Descriptor   WAIS-IV Information 4 ss 4 2 Below Average   TOPF Word Reading 16 SS 75 5 Below Average   NAB Naming 28 Tscore 38 12 Low Average   NAB Naming Percent Correct After Semantic Cuing 33 - - 58 Average   NAB Naming Percent Correct After Phonemic Cuing 50 - - 47 Average   FAS 33 Tscore 44 27 Average   Animal Naming 14 Tscore 40 16 Low Average   VISUOSPATIAL FUNCTIONING Raw Score Type of Standardized Score Standardized Score Percentile/CP Descriptor   HVOT: Total Correct 19.5 Tscore 58 79 High Average   WAIS-IV Block Design 28 ss 8 25 Average   WAIS-IV Visual Puzzles 12 ss 9 37 Average   LEARNING & MEMORY Raw Score Type of Standardized Score Standardized Score Percentile/CP Descriptor   HVLT-R         Total Immediate (2, 8, 8/ 12) 18 Tscore 31 3 Below Average   Delayed Recall 2 Tscore <20 0.1 Exceptionally Low    Retention % 25 Tscore <20 0.1 Exceptionally Low    Hits 7 - - - -   False Positives 7 - - - -   Discrimination  0 Tscore <20 0.1 Exceptionally Low    WMS-IV Subtests         LM I 9 ss 2 0.4 Exceptionally Low    LM II 1 ss 1 0.1 Exceptionally Low    LM Recognition 21 - - 10-16 Low  Average   BVMT-R         IR (2, 5, 5/ 12) 12 Tscore 32 4 Below Average   DR 5 Tscore 35 7 Below Average   Percent Retained 100 - - >16 WNL   Discrimination Index 4 - - 6-10 Below Average   ATTENTION/WORKING MEMORY Raw Score Type of Standardized Score Standardized Score Percentile/CP Descriptor   WAIS-IV Digit Span 23 ss 8 25 Average         DS Forward 12 ss 12 75 High Average         DS Backward 6 ss 7 16 Low Average         DS Sequence 5 ss 6 9 Low Average         Longest Digit Forward 7 - - - -         Longest Digit Backward 3 - - - -         Longest Digit Sequence 6 - - - -   WAIS-IV Arithmetic 7 ss 4 2 Below Average   MENTAL PROCESSING SPEED Raw Score Type of Standardized Score Standardized Score Percentile/CP Descriptor   WAIS-IV Symbol Search 20 ss 7 16 Low Average   WAIS-IV Coding 40 ss 6 9 Low Average   TMT A  45 Tscore 38 12 Low Average   TMT A errors 0 - - - -   EXECUTIVE FUNCTIONING Raw Score Type of Standardized Score Standardized Score Percentile/CP Descriptor   TMT B 294 Tscore 24 1 Exceptionally Low    TMT B errors 4 - - - -   WCST-64         Total Correct 18 SS - - -   Total Errors 46 SS 64 1 Exceptionally Low    Perseverative Resp. 42 SS 66 1 Exceptionally Low    Perseverative Err. 33 SS 66 1 Exceptionally Low    Nonperseverative Err. 13 SS 80 9 Low Average   Concept. Level Response 3 SS 61 0.5 Exceptionally Low    Categories Completed 0 - - 2-5 Below Average   FMS 0 - -  #N/A   Learning to Learn N/A - -  #N/A   WAIS-IV Similarities 18 ss 7 16 Low Average   WAIS-IV Matrix Reasoning 12 ss 8 25 Average   FUNCTIONAL  Raw Score Type of Standardized Score Standardized Score Percentile/CP Descriptor   ADLQ Self Care Activities - Percent - 17 None to Mild   ADLQ Household Care - Percent - 0 None to Mild   ADLQ Employment & Recreation - Percent - 0 None to Mild   ADLQ Shopping & Money - Percent - 0 None to Mild   ADLQ Travel - Percent - 0 None to Mild   ADLQ Communication - Percent - 7 None to Mild   MOOD &  PERSONALITY Raw Score Type of Standardized Score Standardized Score Percentile/CP Descriptor   GDS-30 2 - - - WNL   KSENIA 2 - - - Minimal   ss = scaled score (mean = 10, SD = 3); SS = standard score (mean = 100, SD = 15); Tscore mean = 50, SD = 10; zscore (mean = 0.00, SD = 1)           10/8/2024     2:57 PM   NPIQ RFS   WHO IS FILLING OUT FORM? Caregiver   Does this patient have false beliefs, such as thinking that others are stealing from him/her or planning to harm him/her in some way? No   Does this patient have hallucinations such as false visions or voices? Ocasio she/he seem to hear or see things that are not present? No   Is the patient resistive to help from others at times, or hard to handle? No   Does the patient seem sad or say that he/she is depressed? Yes   Depression/Dysphoria Severity 1   Depression/Dysphoria Distress 2   Does the patient become upset when  from you? Does he/she have any other signs of nervousness such as shortness of breath, sighing, being unable tor elax, or feeling excessively tense? No   Does the patient appear to feel good or act excessively happy? No   Does this patient seem less interested in his/her usual activities or in the activities and plans of others? No   Does this patient seem to act cumpolsively, for example, talking to strangers as if she/he knows them, or saying things that may hurt people's feelings? No   Is the patient impatient and cranky? Does he/she have difficulty coping with delays or waiting for planned activities? No   Does the patient engage in repetitive activities such as pacing around the house, handling buttons, wrapping string, or doing other things repeatedly? No   Does this patient awaken you during the night, rise too early in the morning, or take excessive naps during the day? No   Has the patient lost or gained weight, or had a change in the type of food he/she likes? No   NPI Total Severity Score 1   NPI Total Distress Score 2       Billing  Documentation     Time spent conducting face to face interview with the patient: 58 minutes; 73024.  Time on review of neuropsychological test data and relevant records, data interpretation, and writing/ documentation: 98 minutes; 24780 & 27071 (x1).  Psychometrist time spent in the administration and scoring of 2 or more neuropsychological tests 240 minutes; 20086 & 08316 (x7).     Referral Diagnoses:  G20.A2 (ICD-10-CM) - Parkinson's disease without dyskinesia, with fluctuating manifestations

## 2024-10-08 NOTE — PROGRESS NOTES
Mr. Birch was seen during this appointment for the testing component of his neuropsychological evaluation. Please see my visit note from this same date of service for documentation of his encounter.        _____________________  Myles Vickers, Ph.D.  Neuropsychologist  Department of Neuropsychology  [facility], [place]

## 2024-10-09 ENCOUNTER — TELEPHONE (OUTPATIENT)
Dept: NEUROSURGERY | Facility: CLINIC | Age: 64
End: 2024-10-09
Payer: MEDICARE

## 2024-10-10 ENCOUNTER — OFFICE VISIT (OUTPATIENT)
Dept: NEUROSURGERY | Facility: CLINIC | Age: 64
End: 2024-10-10
Payer: MEDICARE

## 2024-10-10 DIAGNOSIS — G20.A2 PARKINSON'S DISEASE WITHOUT DYSKINESIA, WITH FLUCTUATING MANIFESTATIONS: Primary | ICD-10-CM

## 2024-10-10 PROCEDURE — 99205 OFFICE O/P NEW HI 60 MIN: CPT | Mod: 95,GC,, | Performed by: NEUROLOGICAL SURGERY

## 2024-10-10 NOTE — PROGRESS NOTES
Neurosurgery  History & Physical    SUBJECTIVE:     Chief Complaint: Parkinson disease    History of Present Illness:  ELIZA Birch is a 63 y.o. right-handed male who presents as a referral from Dr. Christopher for consideration of DBS therapy.     Symptoms began about 7 years ago with tremor in the right side, mostly in the upper body. At times he drags the right leg; both sides are affected but moreso the right side.     Goals for surgery: to improve tremor, reduce medications. Medication is not as effective as it used to be.     His wife Pari's niece works with Dr. Santillan. Ms. Yañez helps to provide much of the history and endorses RBD. Their daughter Renetta also joins the call.     In the past, he drank a lot of beer, which Ms. Yañez treated with holy water. His symptoms became more evident when he stopped drinking. He also started having skin lesions around that time on neck, arms, head. These improve with doxycycline; Dr. Miramontes told them that this was a symptom of Parkinson disease.     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 He has 13 years of education. He enjoys hunting, fishing, woodworking. He uses the left shoulder to rest his rifle, so he would like a right-sided pulse generator. 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     The patient denies any bleeding, infectious, or anesthetic complications with any previous surgery.       Review of patient's allergies indicates:  No Known Allergies    Entacapone 200mg TID  Selegiline 5mg BID   Venlafaxine XR 150mg qAM  Carbidopa-Levodopa ER 50/250 TID   Neupro patch 4mg q24  Doxycycline PRN   Finasteride 5mg QHS    No current outpatient medications on file.     No current facility-administered medications for this visit.       Past Medical History:   Diagnosis Date    Depression     Parkinson's disease      Past Surgical History:   Procedure Laterality Date    VASECTOMY       Family History       Problem Relation (Age  of Onset)    Dementia Mother, Father          Social History     Socioeconomic History    Marital status:    Tobacco Use    Smoking status: Never    Smokeless tobacco: Never     Social Drivers of Health     Financial Resource Strain: Medium Risk (9/16/2024)    Overall Financial Resource Strain (CARDIA)     Difficulty of Paying Living Expenses: Somewhat hard   Food Insecurity: Food Insecurity Present (9/16/2024)    Hunger Vital Sign     Worried About Running Out of Food in the Last Year: Often true     Ran Out of Food in the Last Year: Sometimes true   Physical Activity: Sufficiently Active (9/16/2024)    Exercise Vital Sign     Days of Exercise per Week: 7 days     Minutes of Exercise per Session: 60 min   Stress: Stress Concern Present (9/16/2024)    [employer] [place] [employer] - Occupational Stress Questionnaire     Feeling of Stress : Very much   Housing Stability: High Risk (9/16/2024)    Housing Stability Vital Sign     Unable to Pay for Housing in the Last Year: Yes       Review of Systems    OBJECTIVE:     Vital Signs     There is no height or weight on file to calculate BMI.      Physical Exam:    Constitutional: He appears well-developed and well-nourished.     Eyes: EOM are normal.     Skin:   Lesions on head, neck, arms     Psych/Behavior: He is alert. He is oriented to person, place, and time.     Musculoskeletal:      Comments: No focal weakness on video exam     Neurological:   Right-sided resting tremor     Pulmonary: nonlabored respirations     Hematologic: some bruising noted     Diagnostic Results:  No cranial imaging available for review    ASSESSMENT/PLAN:     ELIZA Birch is a 63 y.o. male who presents as a referral from Dr. Christopher for consideration of DBS therapy for Parkinson disease. He will need to be discussed further in interdisciplinary conference, but I suspect he will be a good candidate. He will need MRI brain, DBS protocol, for operative planning purposes.   I also placed a referral for dermatology.     He saw Dr. Vickers for neuropsych.     We had a lengthy discussion about the risks, benefits, and alternatives to deep brain stimulation.  Risks include, but are not limited to, bleeding, pain, infection, scarring, need for further/repeat procedure, failure to slow the progression of neurodegenerative disease symptoms, speech difficulty, balance difficulty, cognitive changes, loss of inhibition, intracranial hemorrhage, venous infarct, seizure, stroke, paralysis, loss of the ability to swim, and death. Hardware-related complications may also occur. This is an implanted device, meaning that any infection elsewhere in the body must be treated immediately to minimize the risk of blood stream infection seeding the device. Should this happen, it is possible that the entire system would require removal.       We also reviewed the work flow employed at [facility] for placement of deep brain stimulation devices.  We discussed that the patient would be admitted Tuesday or Friday morning for placement of 5 fiducial screws.  We clarified this would require shaving the entire head.  After the fiducial screws are placed, the patient would be transferred to CT scan to obtain a fiducial registration CT scan.  This would be merged with the already obtained MRI.  The patient would be brought back to the operating room for definitive placement of the DBS lead.  This is to be done while awake. The patient expressed understanding thereof. The following week, the patient would be readmitted for asleep implantation of extension cable and pulse generator on an outpatient basis.  The device would subsequently be programmed by movement disorders neurology.      I have encouraged the patient to contact the clinic in interim with any questions, concerns, or adverse clinical change.     The patient location is: at home   The chief complaint leading to consultation is: Parkinson disease     Visit type:  audiovisual    Face to Face time with patient: 59  74 minutes of total time spent on the encounter, which includes face to face time and non-face to face time preparing to see the patient (eg, review of tests), Obtaining and/or reviewing separately obtained history, Documenting clinical information in the electronic or other health record, Independently interpreting results (not separately reported) and communicating results to the patient/family/caregiver, or Care coordination (not separately reported).         Each patient to whom he or she provides medical services by telemedicine is:  (1) informed of the relationship between the physician and patient and the respective role of any other health care provider with respect to management of the patient; and (2) notified that he or she may decline to receive medical services by telemedicine and may withdraw from such care at any time.    Notes: Note generated with voice recognition software; please excuse any typographical errors.

## 2024-10-15 PROBLEM — F32.4 MAJOR DEPRESSIVE DISORDER WITH SINGLE EPISODE, IN PARTIAL REMISSION: Status: ACTIVE | Noted: 2024-10-15

## 2024-10-15 PROBLEM — G31.84 MCI (MILD COGNITIVE IMPAIRMENT): Status: ACTIVE | Noted: 2024-10-15

## 2024-10-24 ENCOUNTER — OFFICE VISIT (OUTPATIENT)
Dept: NEUROLOGY | Facility: CLINIC | Age: 64
End: 2024-10-24
Payer: MEDICARE

## 2024-10-24 DIAGNOSIS — G31.84 MCI (MILD COGNITIVE IMPAIRMENT): ICD-10-CM

## 2024-10-24 DIAGNOSIS — G20.A2 PARKINSON'S DISEASE WITHOUT DYSKINESIA, WITH FLUCTUATING MANIFESTATIONS: Primary | ICD-10-CM

## 2024-10-24 DIAGNOSIS — F32.4 MAJOR DEPRESSIVE DISORDER WITH SINGLE EPISODE, IN PARTIAL REMISSION: ICD-10-CM

## 2024-10-24 NOTE — PROGRESS NOTES
Mr. Birch and his family attended a tele-health (audio and video) feedback session to discuss the results from his recent neuropsychological testing (see report dated 10/08/2024). He attended the appointment from his home in [place], [place].     We discussed his test results, diagnoses, and my recommendations. Mr. Birch and his family voiced their understanding of the information provided, and voiced their intention to follow through on the recommendations provided. All questions were answered to their satisfaction and Mr. Birch was provided with a copy of his report. he was encouraged to contact our office with any future questions or concerns.         Billing Documentation     Time on review of neuropsychological test data and relevant records, data interpretation, providing feedback about these results, and writing/ documentation: 58 minutes; 02755          _____________________  Myles Vickers, Ph.D.  Neuropsychologist  Department of Neuropsychology  [facility], [place]

## 2024-11-12 ENCOUNTER — LAB VISIT (OUTPATIENT)
Dept: LAB | Facility: HOSPITAL | Age: 64
End: 2024-11-12
Attending: INTERNAL MEDICINE
Payer: MEDICARE

## 2024-11-12 DIAGNOSIS — R53.83 FATIGUE, UNSPECIFIED TYPE: Primary | ICD-10-CM

## 2024-11-12 DIAGNOSIS — Z82.49 FAMILY HISTORY OF ISCHEMIC HEART DISEASE: ICD-10-CM

## 2024-11-12 DIAGNOSIS — Z79.899 NEED FOR PROPHYLACTIC CHEMOTHERAPY: ICD-10-CM

## 2024-11-12 DIAGNOSIS — E55.9 AVITAMINOSIS D: ICD-10-CM

## 2024-11-12 DIAGNOSIS — Z00.00 ROUTINE GENERAL MEDICAL EXAMINATION AT A HEALTH CARE FACILITY: ICD-10-CM

## 2024-11-12 LAB
25(OH)D3+25(OH)D2 SERPL-MCNC: 26 NG/ML (ref 30–80)
ALBUMIN SERPL-MCNC: 4 G/DL (ref 3.4–4.8)
ALBUMIN/GLOB SERPL: 1.3 RATIO (ref 1.1–2)
ALP SERPL-CCNC: 98 UNIT/L (ref 40–150)
ALT SERPL-CCNC: 8 UNIT/L (ref 0–55)
ANION GAP SERPL CALC-SCNC: 6 MEQ/L
AST SERPL-CCNC: 23 UNIT/L (ref 5–34)
BACTERIA #/AREA URNS AUTO: NORMAL /HPF
BASOPHILS # BLD AUTO: 0.04 X10(3)/MCL
BASOPHILS NFR BLD AUTO: 0.6 %
BILIRUB SERPL-MCNC: 0.5 MG/DL
BILIRUB UR QL STRIP.AUTO: NEGATIVE
BUN SERPL-MCNC: 23.6 MG/DL (ref 8.4–25.7)
CALCIUM SERPL-MCNC: 9.1 MG/DL (ref 8.8–10)
CHLORIDE SERPL-SCNC: 107 MMOL/L (ref 98–107)
CHOLEST SERPL-MCNC: 190 MG/DL
CHOLEST/HDLC SERPL: 3 {RATIO} (ref 0–5)
CLARITY UR: CLEAR
CO2 SERPL-SCNC: 26 MMOL/L (ref 23–31)
COLOR UR AUTO: ABNORMAL
CREAT SERPL-MCNC: 1.14 MG/DL (ref 0.72–1.25)
CREAT/UREA NIT SERPL: 21
EOSINOPHIL # BLD AUTO: 0.13 X10(3)/MCL (ref 0–0.9)
EOSINOPHIL NFR BLD AUTO: 2.1 %
ERYTHROCYTE [DISTWIDTH] IN BLOOD BY AUTOMATED COUNT: 13.1 % (ref 11.5–17)
EST. AVERAGE GLUCOSE BLD GHB EST-MCNC: 105.4 MG/DL
GFR SERPLBLD CREATININE-BSD FMLA CKD-EPI: >60 ML/MIN/1.73/M2
GLOBULIN SER-MCNC: 3.2 GM/DL (ref 2.4–3.5)
GLUCOSE SERPL-MCNC: 82 MG/DL (ref 82–115)
GLUCOSE UR QL STRIP: 100
HBA1C MFR BLD: 5.3 %
HCT VFR BLD AUTO: 43.2 % (ref 42–52)
HDLC SERPL-MCNC: 60 MG/DL (ref 35–60)
HGB BLD-MCNC: 14.2 G/DL (ref 14–18)
HGB UR QL STRIP: NEGATIVE
IMM GRANULOCYTES # BLD AUTO: 0.01 X10(3)/MCL (ref 0–0.04)
IMM GRANULOCYTES NFR BLD AUTO: 0.2 %
KETONES UR QL STRIP: NEGATIVE
LDLC SERPL CALC-MCNC: 109 MG/DL (ref 50–140)
LEUKOCYTE ESTERASE UR QL STRIP: NEGATIVE
LYMPHOCYTES # BLD AUTO: 1.49 X10(3)/MCL (ref 0.6–4.6)
LYMPHOCYTES NFR BLD AUTO: 24.2 %
MAGNESIUM SERPL-MCNC: 1.9 MG/DL (ref 1.6–2.6)
MCH RBC QN AUTO: 30.5 PG (ref 27–31)
MCHC RBC AUTO-ENTMCNC: 32.9 G/DL (ref 33–36)
MCV RBC AUTO: 92.7 FL (ref 80–94)
MONOCYTES # BLD AUTO: 0.33 X10(3)/MCL (ref 0.1–1.3)
MONOCYTES NFR BLD AUTO: 5.4 %
NEUTROPHILS # BLD AUTO: 4.16 X10(3)/MCL (ref 2.1–9.2)
NEUTROPHILS NFR BLD AUTO: 67.5 %
NITRITE UR QL STRIP: NEGATIVE
PH UR STRIP: 6 [PH]
PLATELET # BLD AUTO: 142 X10(3)/MCL (ref 130–400)
PMV BLD AUTO: 9.6 FL (ref 7.4–10.4)
POTASSIUM SERPL-SCNC: 4.6 MMOL/L (ref 3.5–5.1)
PROT SERPL-MCNC: 7.2 GM/DL (ref 5.8–7.6)
PROT UR QL STRIP: NEGATIVE
PSA SERPL-MCNC: 0.18 NG/ML
RBC # BLD AUTO: 4.66 X10(6)/MCL (ref 4.7–6.1)
RBC #/AREA URNS AUTO: NORMAL /HPF
SODIUM SERPL-SCNC: 139 MMOL/L (ref 136–145)
SP GR UR STRIP.AUTO: 1.02 (ref 1–1.03)
SQUAMOUS #/AREA URNS AUTO: NORMAL /HPF
TRIGL SERPL-MCNC: 106 MG/DL (ref 34–140)
TSH SERPL-ACNC: 1.15 UIU/ML (ref 0.35–4.94)
UROBILINOGEN UR STRIP-ACNC: 0.2
VLDLC SERPL CALC-MCNC: 21 MG/DL
WBC # BLD AUTO: 6.16 X10(3)/MCL (ref 4.5–11.5)
WBC #/AREA URNS AUTO: NORMAL /HPF

## 2024-11-12 PROCEDURE — 83735 ASSAY OF MAGNESIUM: CPT

## 2024-11-12 PROCEDURE — 81003 URINALYSIS AUTO W/O SCOPE: CPT

## 2024-11-12 PROCEDURE — 84443 ASSAY THYROID STIM HORMONE: CPT

## 2024-11-12 PROCEDURE — 80053 COMPREHEN METABOLIC PANEL: CPT

## 2024-11-12 PROCEDURE — 83036 HEMOGLOBIN GLYCOSYLATED A1C: CPT

## 2024-11-12 PROCEDURE — 85025 COMPLETE CBC W/AUTO DIFF WBC: CPT

## 2024-11-12 PROCEDURE — 36415 COLL VENOUS BLD VENIPUNCTURE: CPT

## 2024-11-12 PROCEDURE — 84436 ASSAY OF TOTAL THYROXINE: CPT

## 2024-11-12 PROCEDURE — 84153 ASSAY OF PSA TOTAL: CPT

## 2024-11-12 PROCEDURE — 80061 LIPID PANEL: CPT

## 2024-11-12 PROCEDURE — 82306 VITAMIN D 25 HYDROXY: CPT

## 2024-11-13 LAB
FT4I SERPL CALC-MCNC: 5.4 MCG/DL (ref 4.8–12.7)
T4 SERPL IA-MCNC: 5.9 MCG/DL (ref 4.5–11.7)
TOTAL TBC SERPL: 1.1 TBI (ref 0.8–1.3)

## 2025-01-08 ENCOUNTER — TELEPHONE (OUTPATIENT)
Facility: CLINIC | Age: 65
End: 2025-01-08
Payer: MEDICARE

## 2025-01-08 NOTE — TELEPHONE ENCOUNTER
Called PT wife today to set her  up with an in person or virtual appt to see the provider to discuss the goals of DBS. PT wife did not  left a message.

## 2025-01-08 NOTE — TELEPHONE ENCOUNTER
----- Message from Yessica Christopher MD sent at 1/2/2025 12:55 PM CST -----  Regarding: DBS appt  Please call wife can ask that wer make an appointment to discuss goals of DBS (she must be present) - this can be virtual, then book next available   No

## 2025-01-15 ENCOUNTER — TELEPHONE (OUTPATIENT)
Facility: CLINIC | Age: 65
End: 2025-01-15
Payer: MEDICARE

## 2025-01-15 NOTE — TELEPHONE ENCOUNTER
Called PT's wife to set up an appt with the provider to discuss the Goals of DBS. Pts wife picks up the call. The appt has been made for virtual visit on 1/27/25 at 8:00am. Also wife did inform me that the doctor was going to send the  to contact her. Also informing me that her  health is going downhill.

## 2025-01-15 NOTE — TELEPHONE ENCOUNTER
----- Message from Danielle sent at 1/8/2025  4:32 PM CST -----  Regarding: Missed call  Contact: Pari/wife [phone]  Patient's wife Pari is returning a missed called from Pepito Celestin Jr., MA in the office. Please call back as soon as possible.

## 2025-02-19 ENCOUNTER — PATIENT MESSAGE (OUTPATIENT)
Facility: CLINIC | Age: 65
End: 2025-02-19
Payer: MEDICARE

## 2025-02-19 ENCOUNTER — TELEPHONE (OUTPATIENT)
Facility: CLINIC | Age: 65
End: 2025-02-19
Payer: MEDICARE

## 2025-02-19 NOTE — TELEPHONE ENCOUNTER
"SW spoke with pt Nigel and wife Pari on speaker phone, per request of neurology providers.     Wife relayed how pt is ineligible for DBS due to cognitive deficits.     Adult children live near by and they see them every day, along with granddaughter. No hired help at home, but family is very involved.    Medication Management issues  Wife discussed how pt is triggered by family asking whether he took medication or not when they attempt to remind him, as he does forget to take his meds often. Also discussed pt's resistance to taking meds, upping doses, etc. SW spoke with Nigel about challenging his perceptions of medication and independence, allowing family members to help addie with doses later in the day. Pt agreed to allow more help with meds through out day.    Mood  SW discussed how irritability may be PD sx, as well as stemming from pt's perception of his independence being threatened. Pt endorsed "shorter fuse".    SW provided education around the emotional and cognitive sx of PD and how they interact with/ effect/ are effected by motor sx, Off times. SW discussed executive dysfunction ie inability to multitask, switch between tasks-- including taking his meds when he is in the middle of doing something.     Pt and wife relayed appreciation for this info, and requested SW facilitate family meeting to discuss pt's care plan and provide education around PD dx.    MELISSA plans to speak with pt's wife tomorrow solo to provide caregiver support.     [facility] Armas [facility]    Neurology ? Movement Disorders  [facility] ? [address] [address]   P: [phone]  F: [phone]         "

## 2025-02-27 ENCOUNTER — TELEPHONE (OUTPATIENT)
Dept: NEUROLOGY | Facility: CLINIC | Age: 65
End: 2025-02-27
Payer: MEDICARE

## 2025-02-28 ENCOUNTER — TELEPHONE (OUTPATIENT)
Facility: CLINIC | Age: 65
End: 2025-02-28
Payer: MEDICARE

## 2025-02-28 NOTE — TELEPHONE ENCOUNTER
Returned call to pt requesting test results. Pt's wife stated she has seen the neuropsych results but cannot understand them.    Informed pt's wife that the notes are considered sensitive and I am not able to read them to assist.    Informed pt's wife I would forward her request to the provider. She verbalized understanding.

## 2025-03-03 ENCOUNTER — TELEPHONE (OUTPATIENT)
Dept: NEUROLOGY | Facility: CLINIC | Age: 65
End: 2025-03-03
Payer: MEDICARE

## 2025-03-03 ENCOUNTER — TELEPHONE (OUTPATIENT)
Facility: CLINIC | Age: 65
End: 2025-03-03
Payer: MEDICARE

## 2025-03-03 NOTE — TELEPHONE ENCOUNTER
----- Message from Ratna sent at 3/3/2025 11:21 AM CST -----  Pt spouse calling in regards naomi , would like today at 3:30 for virtual , please call to confirm Confirmed patient's contact info below:Contact Name: ELIZA BirchPhone Number: [phone]

## 2025-03-03 NOTE — TELEPHONE ENCOUNTER
Called wife today to schedule a virtual appt to see the doctor. PT's Wife picks up I offer an appt for today. Pts wife informs me that her  will be seeing another doctor today for her husbands results. She said she will call back to see if she can have the appt for today.

## 2025-03-03 NOTE — TELEPHONE ENCOUNTER
I called Mr. Birch to discuss outstanding questions related to his neuropsychological assessment completed in 10/2024. I spoke to Mr. Birch, his wife, and his daughter via phone to clarify the results, my findings, and recommendations. They also provided important interval changes that are pertinent to his cognitive disorder diagnosis.    Mr. Birch and his family additionally informed me of the following updates:  Mr. Birch has had progressively-worsening memory loss over the past 4 months; and has begun to make more-frequent medication errors as a result of forgetting. Alarms have not been sufficient to mitigate this problem. Their plan is to have his wife provide his medications to him to prevent missed doses.   Mr. Birch had the subacute onset of facial changes over the course of a few weeks to months. Given gradual onset of changes, I am less worried about an acute process such as CVA; however, follow up with their neurology team is indicated.    I communicated to Mr. Birch and his family that his reported functional changes increase my index of suspicion for major neurocognitive disorder, with Parkinson's disease remaining the primary etiology. This may be pertinent to pre-DBS considerations and care management options. I reinforced the/ value of repeat assessment, which may be accomplished between 8-12 months after his 10/08/2024 assessment.    Total time spent discussing the above with Mr. Birch and his family and documenting this phone call: < 30 minutes. No billing submitted.

## 2025-05-16 ENCOUNTER — PATIENT MESSAGE (OUTPATIENT)
Facility: CLINIC | Age: 65
End: 2025-05-16
Payer: MEDICARE